# Patient Record
(demographics unavailable — no encounter records)

---

## 2025-05-28 NOTE — DISCUSSION/SUMMARY
[FreeTextEntry1] : 53-year-old  4 para 4 here for annual GYN visit  Healthcare maintenance: Pap HPV collected.  Up-to-date with colonoscopy.  Rx DEXA given LMP 10 years ago.  Left breast cancer: Status post chemoradiation, left mastectomy with bilateral reconstruction however patient unsure.  Discussed with patient to discuss with her breast surgeon and oncologist.  Right mammogram and right breast ultrasound ordered.  Patient states no genetic testing in her mother, not interested at this time.  Will plan for q. yearly pelvic ultrasound  Follow-up 1 year

## 2025-05-28 NOTE — HISTORY OF PRESENT ILLNESS
[Patient reported mammogram was normal] : Patient reported mammogram was normal [Patient reported PAP Smear was normal] : Patient reported PAP Smear was normal [Patient reported colonoscopy was normal] : Patient reported colonoscopy was normal [LMP unknown] : LMP unknown [Monogamous (Male Partner)] : is monogamous with a male partner [Y] : Positive pregnancy history [Mammogramdate] : 2023 [PapSmeardate] : 2023 [ColonoscopyDate] : 2018 [PGHxTotal] : 4 [[facility] : [facility] [[facility]] : 4 [FreeTextEntry1] :  x 4

## 2025-05-28 NOTE — PHYSICAL EXAM
[TextEntry] : General appearance well-appearing no acute distress  Breast examined in the upright and supine position.  Bilateral areola and nipples surgically absent.  Left breast with reconstruction, right breast within normal limits, no lymphadenopathy bilaterally, no masses palpated bilaterally. Abdominoplasty scar noted Normal external genitalia  Speculum inserted normal-appearing vagina no lesions no abnormal discharge cervix appears closed no lesions. Pap collected  Bimanual exam performed. Anteverted uterus nontender no cervical motion tenderness no adnexal masses bilaterally, no adnexal tenderness bilaterally  Rectal exam guaiac negative